# Patient Record
Sex: FEMALE | Race: WHITE | NOT HISPANIC OR LATINO | Employment: UNEMPLOYED | URBAN - NONMETROPOLITAN AREA
[De-identification: names, ages, dates, MRNs, and addresses within clinical notes are randomized per-mention and may not be internally consistent; named-entity substitution may affect disease eponyms.]

---

## 2022-06-27 ENCOUNTER — OFFICE VISIT (OUTPATIENT)
Dept: GYNECOLOGIC ONCOLOGY | Facility: CLINIC | Age: 62
End: 2022-06-27

## 2022-06-27 VITALS
RESPIRATION RATE: 16 BRPM | OXYGEN SATURATION: 94 % | SYSTOLIC BLOOD PRESSURE: 154 MMHG | WEIGHT: 143 LBS | DIASTOLIC BLOOD PRESSURE: 83 MMHG | HEART RATE: 87 BPM

## 2022-06-27 DIAGNOSIS — R97.1 ELEVATED CA-125: ICD-10-CM

## 2022-06-27 DIAGNOSIS — Z85.42 HISTORY OF UTERINE CANCER: Primary | ICD-10-CM

## 2022-06-27 DIAGNOSIS — N95.2 ATROPHY OF VAGINA: ICD-10-CM

## 2022-06-27 PROCEDURE — 99205 OFFICE O/P NEW HI 60 MIN: CPT | Performed by: OBSTETRICS & GYNECOLOGY

## 2022-06-27 RX ORDER — PRAVASTATIN SODIUM 20 MG
TABLET ORAL
COMMUNITY
Start: 2022-04-19

## 2022-06-27 RX ORDER — MONTELUKAST SODIUM 10 MG/1
TABLET ORAL
COMMUNITY
Start: 2022-04-19

## 2022-06-27 RX ORDER — ESTRADIOL 0.1 MG/G
2 CREAM VAGINAL 3 TIMES WEEKLY
Qty: 42.5 G | Refills: 12 | Status: SHIPPED | OUTPATIENT
Start: 2022-06-27

## 2022-06-27 RX ORDER — LISINOPRIL 5 MG/1
TABLET ORAL
COMMUNITY
Start: 2022-05-03

## 2022-06-27 RX ORDER — ALBUTEROL SULFATE 90 UG/1
AEROSOL, METERED RESPIRATORY (INHALATION)
COMMUNITY
Start: 2022-05-07

## 2022-06-27 NOTE — PROGRESS NOTES
New Patient Office Visit      Patient Name: Archana Henry  : 1960   MRN: [id number]     Referring Physician: Dr. Gael Monson    Chief Complaint:  History of endometrial cancer    History of Present Illness: Archana Henry is a 61 y.o. female who is here today as a consultation with gynecologic oncology for a history of stage IIIA endometrial cancer diagnosed in . She has been doing very well. She was having CA-125 levels followed by her PCP which became elevated 2 years ago. She has undergone multiple scans and blood draws. The CA-125 continues to fluctuate and all of her imaging has not demonstrated any evidence of disease. She recently moved here to [place] and is here to establish care. She is doing well today. She does have a longstanding history of vaginal dryness and irritation. She has not been offered anything for treatment of this.    Oncologic History:  Oncology/Hematology History   Endometrial cancer (HCC)    Initial Diagnosis    Diagnosed with stage IIIA endometrial cancer. Underwent 6 cycles of carboplatin/paclitaxel in [place], [place].      2009 Cancer Staged    Cancer Staging  Endometrial cancer ([facility])  Staging form: Corpus Uteri - Carcinoma And Carcinosarcoma, AJCC 8th Edition  - Clinical stage from 2009: FIGO Stage IIIA, calculated as Stage Unknown (cT3a, cNX, cM0) - Signed by Maru Tamayo MD on 2022 Imaging    PET with no clear evidence for recurrence     2020 Imaging    CT C/A/P with no interval change. No definite metastatic disease. Retroperitoneal soft tissue is stable and given left renal findings could be retroperitoneal fibrosis.     10/12/2021 Imaging    CT C/A/P with unchanged soft tissue density in the retroperitoneum surrounding the aorta. No evidence of disease progression. Diffuse bronchial wall thickening with extensive mucus plugging with tree-in-bud nodules in the bilateral lower lobes.          Past Medical History:   Past  Medical History:   Diagnosis Date   • Asthma    • Diabetes mellitus (HCC)    • Diverticulitis    • Endometrial cancer (HCC)    • Hypertension        Past Surgical History:   Past Surgical History:   Procedure Laterality Date   • BREAST BIOPSY     • TOTAL LAPAROSCOPIC HYSTERECTOMY SALPINGO OOPHORECTOMY         Family History:   Family History   Family history unknown: Yes       Social History:   Social History     Socioeconomic History   • Marital status:    • Number of children: 1   Tobacco Use   • Smoking status: Former Smoker   Substance and Sexual Activity   • Alcohol use: Never   • Drug use: Never   • Sexual activity: Not Currently       Past OB/GYN History:   OB History    Para Term  AB Living   1 1       1   SAB IAB Ectopic Molar Multiple Live Births                    # Outcome Date GA Lbr Juni/2nd Weight Sex Delivery Anes PTL Lv   1 Para            1   Denies any history of abnormal pap tests  Menopause in  with hysterectomy    Health Maintenance:   Mammogram: Date: 10/8/2021 Results: normal  Colonoscopy: Date: 10/15/2020 Results: normal     Medications:     Current Outpatient Medications:   •  albuterol sulfate  (90 Base) MCG/ACT inhaler, , Disp: , Rfl:   •  Breo Ellipta 100-25 MCG/INH inhaler, Inhale 1 puff Daily., Disp: , Rfl:   •  calcium citrate-vitamin d (CITRACAL) 200-250 MG-UNIT tablet tablet, Take  by mouth Daily., Disp: , Rfl:   •  estradiol (ESTRACE VAGINAL) 0.1 MG/GM vaginal cream, Insert 2 g into the vagina 3 (Three) Times a Week. Indications: Vulvovaginal Atrophy, Disp: 42.5 g, Rfl: 12  •  lisinopril (PRINIVIL,ZESTRIL) 5 MG tablet, , Disp: , Rfl:   •  montelukast (SINGULAIR) 10 MG tablet, , Disp: , Rfl:   •  pravastatin (PRAVACHOL) 20 MG tablet, , Disp: , Rfl:     Allergies:   No Known Allergies    Review of Systems:   Review of Systems   Constitutional: Negative for activity change, appetite change, fatigue and unexpected weight change.   Respiratory: Positive  for cough. Negative for chest tightness and shortness of breath.    Cardiovascular: Negative for chest pain and leg swelling.   Gastrointestinal: Negative for abdominal pain, constipation, diarrhea and nausea.   Genitourinary: Negative for difficulty urinating, dysuria, frequency, urgency, vaginal bleeding and vaginal discharge.   Musculoskeletal: Positive for arthralgias and myalgias.        Objective     Physical Exam:  Vital Signs:   Vitals:    06/27/22 1035   BP: 154/83   Pulse: 87   Resp: 16   SpO2: 94%   Weight: 64.9 kg (143 lb)   PainSc: 0-No pain     BMI: There is no height or weight on file to calculate BMI.   ECOG score: 0           PHQ-2 Depression Screening  Little interest or pleasure in doing things?     Feeling down, depressed, or hopeless?     PHQ-2 Total Score       Physical Exam  Vitals and nursing note reviewed. Exam conducted with a chaperone present.   Constitutional:       General: She is not in acute distress.     Appearance: Normal appearance. She is well-developed. She is not diaphoretic.   HENT:      Head: Normocephalic and atraumatic.      Right Ear: External ear normal.      Left Ear: External ear normal.      Nose:      Comments: Mask  Eyes:      General: No scleral icterus.        Right eye: No discharge.         Left eye: No discharge.      Conjunctiva/sclera: Conjunctivae normal.   Neck:      Thyroid: No thyromegaly.   Cardiovascular:      Rate and Rhythm: Normal rate and regular rhythm.      Heart sounds: No murmur heard.  Pulmonary:      Effort: Pulmonary effort is normal. No respiratory distress.      Breath sounds: Normal breath sounds. No wheezing.   Abdominal:      General: Bowel sounds are normal. There is no distension.      Palpations: Abdomen is soft. There is no mass.      Tenderness: There is no abdominal tenderness. There is no guarding.   Genitourinary:     Comments: External genitalia normal. Vagina without discharge. Cervix, uterus and adnexa surgically absent.  Rectovaginal exam deferred.  Musculoskeletal:         General: No swelling, tenderness, deformity or signs of injury.      Cervical back: Neck supple.      Right lower leg: No edema.      Left lower leg: No edema.   Lymphadenopathy:      Cervical: No cervical adenopathy.   Skin:     General: Skin is warm and dry.      Coloration: Skin is not jaundiced.      Findings: No erythema, lesion or rash.   Neurological:      General: No focal deficit present.      Mental Status: She is alert and oriented to person, place, and time. Mental status is at baseline.      Motor: No abnormal muscle tone.   Psychiatric:         Behavior: Behavior normal.         Thought Content: Thought content normal.     Imaging:  See oncology history for review of imaging reports    Assessment / Plan    Archana Henry is a 61 y.o. with a history of a stage IIIA endometrial cancer s/p surgery followed by 6 cycles of chemotherapy (treatment completed in 2009).  She is currently without clinical evidence of disease.  She has had issues with fluctuating  over the past 2 years and has remained asymptomatic with negative imaging.  At this point I would not recommend following  unless new symptoms or issues develop as it does not seem to be reliable test in this patient.  Archana verbalizes understanding and is in agreement. Signs of recurrent disease, such as vaginal bleeding, persistent abdominopelvic pain, urinary or bowel changes, and shortness of breath were reviewed.  She was advised to follow up immediately if she develops any of the above symptoms.  If she does develop new symptoms we will repeat a  and imaging.  Otherwise, she will follow-up in 1 year.    Significant vulvovaginal atrophy: Patient very symptomatic.  Exam today with significant atrophy.  We reviewed that studies has shown that hormone replacement therapy appears to be safe in women with a history of gynecologic cancer.  We also discussed that with estrogen cream  that the systemic dose of estrogen is quite low and I would not expect this to have any systemic impact on her.  She would like to proceed with trialing a vaginal estrogen cream.  Prescription for Estrace cream sent to patient's pharmacy.  We also discussed nonhormonal management including coconut oil and Replens as needed.  Patient verbalizes understanding.    Health maintenance: She was reminded to maintain a healthy lifestyle with a well balanced diet, calcium and vitamin D for osteoporosis prevention, and exercise as well as continue with recommended health and cancer screening guidelines.     Pain assessment was performed today as a part of patient’s care.  For patients with pain related to surgery, gynecologic malignancy or cancer treatment, the plan is as noted in the assessment/plan.  For patients with pain not related to these issues, they are to seek any further needed care from a more appropriate provider, such as PCP.      Diagnoses and all orders for this visit:    1. History of uterine cancer (Primary)    2. Elevated CA-125    3. Atrophy of vagina    Other orders  -     estradiol (ESTRACE VAGINAL) 0.1 MG/GM vaginal cream; Insert 2 g into the vagina 3 (Three) Times a Week. Indications: Vulvovaginal Atrophy  Dispense: 42.5 g; Refill: 12         Follow Up: 1 year    I spent 60 minutes caring for Archana on this date of service. This time includes time spent by me in the following activities: preparing for the visit, reviewing tests, performing a medically appropriate examination and/or evaluation, counseling and educating the patient/family/caregiver, ordering medications, tests, or procedures, documenting information in the medical record and care coordination      LUBA Tamayo MD  Gynecologic Oncology

## 2022-10-27 ENCOUNTER — HOSPITAL ENCOUNTER (OUTPATIENT)
Dept: MAMMOGRAPHY | Facility: HOSPITAL | Age: 62
Discharge: HOME | End: 2022-10-27
Admitting: FAMILY MEDICINE

## 2022-10-27 DIAGNOSIS — Z12.31 VISIT FOR SCREENING MAMMOGRAM: ICD-10-CM

## 2022-10-27 PROCEDURE — 77067 SCR MAMMO BI INCL CAD: CPT | Performed by: RADIOLOGY

## 2022-10-27 PROCEDURE — 77063 BREAST TOMOSYNTHESIS BI: CPT | Performed by: RADIOLOGY

## 2022-10-27 PROCEDURE — 77067 SCR MAMMO BI INCL CAD: CPT

## 2022-10-27 PROCEDURE — 77063 BREAST TOMOSYNTHESIS BI: CPT
